# Patient Record
Sex: MALE | Race: OTHER | NOT HISPANIC OR LATINO
[De-identification: names, ages, dates, MRNs, and addresses within clinical notes are randomized per-mention and may not be internally consistent; named-entity substitution may affect disease eponyms.]

---

## 2021-05-10 ENCOUNTER — APPOINTMENT (OUTPATIENT)
Dept: INTERNAL MEDICINE | Facility: CLINIC | Age: 86
End: 2021-05-10
Payer: MEDICARE

## 2021-05-10 ENCOUNTER — NON-APPOINTMENT (OUTPATIENT)
Age: 86
End: 2021-05-10

## 2021-05-10 VITALS
WEIGHT: 163 LBS | OXYGEN SATURATION: 98 % | BODY MASS INDEX: 22.82 KG/M2 | SYSTOLIC BLOOD PRESSURE: 129 MMHG | TEMPERATURE: 97.8 F | HEART RATE: 80 BPM | HEIGHT: 71 IN | DIASTOLIC BLOOD PRESSURE: 65 MMHG

## 2021-05-10 DIAGNOSIS — H93.A3 PULSATILE TINNITUS, BILATERAL: ICD-10-CM

## 2021-05-10 DIAGNOSIS — E78.00 PURE HYPERCHOLESTEROLEMIA, UNSPECIFIED: ICD-10-CM

## 2021-05-10 DIAGNOSIS — F41.9 ANXIETY DISORDER, UNSPECIFIED: ICD-10-CM

## 2021-05-10 PROCEDURE — G0444 DEPRESSION SCREEN ANNUAL: CPT | Mod: 59

## 2021-05-10 PROCEDURE — G0442 ANNUAL ALCOHOL SCREEN 15 MIN: CPT | Mod: 59

## 2021-05-10 PROCEDURE — 93000 ELECTROCARDIOGRAM COMPLETE: CPT | Mod: 59

## 2021-05-10 PROCEDURE — 99204 OFFICE O/P NEW MOD 45 MIN: CPT | Mod: 25

## 2021-05-10 RX ORDER — AMLODIPINE BESYLATE 10 MG/1
10 TABLET ORAL
Qty: 90 | Refills: 0 | Status: DISCONTINUED | COMMUNITY
Start: 2020-12-02 | End: 2021-05-10

## 2021-05-11 LAB
25(OH)D3 SERPL-MCNC: 27.1 NG/ML
ALBUMIN SERPL ELPH-MCNC: 4.3 G/DL
ALP BLD-CCNC: 63 U/L
ALT SERPL-CCNC: 13 U/L
ANION GAP SERPL CALC-SCNC: 12 MMOL/L
AST SERPL-CCNC: 16 U/L
BASOPHILS # BLD AUTO: 0.07 K/UL
BASOPHILS NFR BLD AUTO: 1.1 %
BILIRUB SERPL-MCNC: 0.4 MG/DL
BUN SERPL-MCNC: 54 MG/DL
CALCIUM SERPL-MCNC: 9.2 MG/DL
CHLORIDE SERPL-SCNC: 105 MMOL/L
CHOLEST SERPL-MCNC: 201 MG/DL
CO2 SERPL-SCNC: 23 MMOL/L
CREAT SERPL-MCNC: 2.29 MG/DL
CREAT SPEC-SCNC: 106 MG/DL
CREAT/PROT UR: 0.3 RATIO
EOSINOPHIL # BLD AUTO: 0.13 K/UL
EOSINOPHIL NFR BLD AUTO: 2.1 %
ESTIMATED AVERAGE GLUCOSE: 120 MG/DL
GLUCOSE SERPL-MCNC: 99 MG/DL
HBA1C MFR BLD HPLC: 5.8 %
HCT VFR BLD CALC: 37.5 %
HDLC SERPL-MCNC: 74 MG/DL
HGB BLD-MCNC: 11.8 G/DL
IMM GRANULOCYTES NFR BLD AUTO: 0.2 %
LDLC SERPL CALC-MCNC: 109 MG/DL
LYMPHOCYTES # BLD AUTO: 1.52 K/UL
LYMPHOCYTES NFR BLD AUTO: 25 %
MAN DIFF?: NORMAL
MCHC RBC-ENTMCNC: 30.3 PG
MCHC RBC-ENTMCNC: 31.5 GM/DL
MCV RBC AUTO: 96.2 FL
MONOCYTES # BLD AUTO: 0.56 K/UL
MONOCYTES NFR BLD AUTO: 9.2 %
NEUTROPHILS # BLD AUTO: 3.8 K/UL
NEUTROPHILS NFR BLD AUTO: 62.4 %
NONHDLC SERPL-MCNC: 127 MG/DL
PLATELET # BLD AUTO: 285 K/UL
POTASSIUM SERPL-SCNC: 5.2 MMOL/L
PROT SERPL-MCNC: 6.3 G/DL
PROT UR-MCNC: 35 MG/DL
PSA SERPL-MCNC: 4.87 NG/ML
RBC # BLD: 3.9 M/UL
RBC # FLD: 15.9 %
SODIUM SERPL-SCNC: 140 MMOL/L
TRIGL SERPL-MCNC: 91 MG/DL
TSH SERPL-ACNC: 2.7 UIU/ML
WBC # FLD AUTO: 6.09 K/UL

## 2021-11-04 ENCOUNTER — APPOINTMENT (OUTPATIENT)
Dept: INTERNAL MEDICINE | Facility: CLINIC | Age: 86
End: 2021-11-04
Payer: MEDICARE

## 2021-11-04 PROCEDURE — G0008: CPT

## 2021-11-04 PROCEDURE — 90662 IIV NO PRSV INCREASED AG IM: CPT

## 2022-03-08 ENCOUNTER — APPOINTMENT (OUTPATIENT)
Dept: INTERNAL MEDICINE | Facility: CLINIC | Age: 87
End: 2022-03-08
Payer: MEDICARE

## 2022-03-08 ENCOUNTER — LABORATORY RESULT (OUTPATIENT)
Age: 87
End: 2022-03-08

## 2022-03-08 VITALS
OXYGEN SATURATION: 95 % | WEIGHT: 169 LBS | TEMPERATURE: 97.9 F | HEART RATE: 64 BPM | DIASTOLIC BLOOD PRESSURE: 58 MMHG | SYSTOLIC BLOOD PRESSURE: 123 MMHG | HEIGHT: 71 IN | BODY MASS INDEX: 23.66 KG/M2

## 2022-03-08 DIAGNOSIS — E87.5 HYPERKALEMIA: ICD-10-CM

## 2022-03-08 DIAGNOSIS — M79.89 OTHER SPECIFIED SOFT TISSUE DISORDERS: ICD-10-CM

## 2022-03-08 PROCEDURE — 36415 COLL VENOUS BLD VENIPUNCTURE: CPT

## 2022-03-08 PROCEDURE — 99214 OFFICE O/P EST MOD 30 MIN: CPT | Mod: 25

## 2022-03-09 ENCOUNTER — APPOINTMENT (OUTPATIENT)
Dept: ULTRASOUND IMAGING | Facility: HOSPITAL | Age: 87
End: 2022-03-09
Payer: MEDICARE

## 2022-03-09 ENCOUNTER — OUTPATIENT (OUTPATIENT)
Dept: OUTPATIENT SERVICES | Facility: HOSPITAL | Age: 87
LOS: 1 days | End: 2022-03-09
Payer: MEDICARE

## 2022-03-09 ENCOUNTER — NON-APPOINTMENT (OUTPATIENT)
Age: 87
End: 2022-03-09

## 2022-03-09 PROBLEM — E87.5 HYPERKALEMIA: Status: ACTIVE | Noted: 2022-03-09

## 2022-03-09 LAB
25(OH)D3 SERPL-MCNC: 15.7 NG/ML
ALBUMIN SERPL ELPH-MCNC: 4.5 G/DL
ALP BLD-CCNC: 77 U/L
ALT SERPL-CCNC: 7 U/L
ANION GAP SERPL CALC-SCNC: 12 MMOL/L
AST SERPL-CCNC: 17 U/L
BILIRUB SERPL-MCNC: 0.4 MG/DL
BUN SERPL-MCNC: 48 MG/DL
CALCIUM SERPL-MCNC: 9.2 MG/DL
CHLORIDE SERPL-SCNC: 107 MMOL/L
CO2 SERPL-SCNC: 21 MMOL/L
CREAT SERPL-MCNC: 2.59 MG/DL
CREAT SPEC-SCNC: 96 MG/DL
CREAT/PROT UR: 0.7 RATIO
DEPRECATED D DIMER PPP IA-ACNC: 1135 NG/ML DDU
EGFR: 23 ML/MIN/1.73M2
ERYTHROCYTE [SEDIMENTATION RATE] IN BLOOD BY WESTERGREN METHOD: 24 MM/HR
GLUCOSE SERPL-MCNC: 104 MG/DL
NT-PROBNP SERPL-MCNC: 218 PG/ML
POTASSIUM SERPL-SCNC: 5.9 MMOL/L
PROT SERPL-MCNC: 6.8 G/DL
PROT UR-MCNC: 70 MG/DL
SODIUM SERPL-SCNC: 140 MMOL/L

## 2022-03-09 PROCEDURE — 93971 EXTREMITY STUDY: CPT | Mod: 26,RT

## 2022-03-09 PROCEDURE — 93971 EXTREMITY STUDY: CPT

## 2022-03-10 ENCOUNTER — EMERGENCY (EMERGENCY)
Facility: HOSPITAL | Age: 87
LOS: 1 days | Discharge: HOME | End: 2022-03-10
Attending: EMERGENCY MEDICINE | Admitting: EMERGENCY MEDICINE
Payer: MEDICARE

## 2022-03-10 VITALS
OXYGEN SATURATION: 98 % | SYSTOLIC BLOOD PRESSURE: 172 MMHG | HEART RATE: 79 BPM | HEIGHT: 71 IN | WEIGHT: 171.08 LBS | TEMPERATURE: 98 F | RESPIRATION RATE: 18 BRPM | DIASTOLIC BLOOD PRESSURE: 72 MMHG

## 2022-03-10 VITALS
RESPIRATION RATE: 17 BRPM | OXYGEN SATURATION: 99 % | DIASTOLIC BLOOD PRESSURE: 78 MMHG | HEART RATE: 71 BPM | SYSTOLIC BLOOD PRESSURE: 166 MMHG

## 2022-03-10 DIAGNOSIS — Z88.0 ALLERGY STATUS TO PENICILLIN: ICD-10-CM

## 2022-03-10 DIAGNOSIS — I82.401 ACUTE EMBOLISM AND THROMBOSIS OF UNSPECIFIED DEEP VEINS OF RIGHT LOWER EXTREMITY: ICD-10-CM

## 2022-03-10 DIAGNOSIS — E78.5 HYPERLIPIDEMIA, UNSPECIFIED: ICD-10-CM

## 2022-03-10 DIAGNOSIS — N18.9 CHRONIC KIDNEY DISEASE, UNSPECIFIED: ICD-10-CM

## 2022-03-10 DIAGNOSIS — Z87.438 PERSONAL HISTORY OF OTHER DISEASES OF MALE GENITAL ORGANS: ICD-10-CM

## 2022-03-10 DIAGNOSIS — I12.9 HYPERTENSIVE CHRONIC KIDNEY DISEASE WITH STAGE 1 THROUGH STAGE 4 CHRONIC KIDNEY DISEASE, OR UNSPECIFIED CHRONIC KIDNEY DISEASE: ICD-10-CM

## 2022-03-10 DIAGNOSIS — Z20.822 CONTACT WITH AND (SUSPECTED) EXPOSURE TO COVID-19: ICD-10-CM

## 2022-03-10 DIAGNOSIS — R22.41 LOCALIZED SWELLING, MASS AND LUMP, RIGHT LOWER LIMB: ICD-10-CM

## 2022-03-10 LAB
ALBUMIN SERPL ELPH-MCNC: 4.4 G/DL — SIGNIFICANT CHANGE UP (ref 3.3–5)
ALP SERPL-CCNC: 72 U/L — SIGNIFICANT CHANGE UP (ref 40–120)
ALT FLD-CCNC: 12 U/L — SIGNIFICANT CHANGE UP (ref 10–45)
ANION GAP SERPL CALC-SCNC: 14 MMOL/L — SIGNIFICANT CHANGE UP (ref 5–17)
APPEARANCE: CLEAR
APTT BLD: 29.3 SEC — SIGNIFICANT CHANGE UP (ref 27.5–35.5)
AST SERPL-CCNC: 19 U/L — SIGNIFICANT CHANGE UP (ref 10–40)
BASOPHILS # BLD AUTO: 0.08 K/UL
BASOPHILS # BLD AUTO: 0.08 K/UL — SIGNIFICANT CHANGE UP (ref 0–0.2)
BASOPHILS NFR BLD AUTO: 1.1 %
BASOPHILS NFR BLD AUTO: 1.1 % — SIGNIFICANT CHANGE UP (ref 0–2)
BILIRUB SERPL-MCNC: 0.4 MG/DL — SIGNIFICANT CHANGE UP (ref 0.2–1.2)
BILIRUBIN URINE: NEGATIVE
BLOOD URINE: NEGATIVE
BUN SERPL-MCNC: 48 MG/DL — HIGH (ref 7–23)
CALCIUM SERPL-MCNC: 9.1 MG/DL — SIGNIFICANT CHANGE UP (ref 8.4–10.5)
CHLORIDE SERPL-SCNC: 106 MMOL/L — SIGNIFICANT CHANGE UP (ref 96–108)
CO2 SERPL-SCNC: 20 MMOL/L — LOW (ref 22–31)
COLOR: YELLOW
CREAT SERPL-MCNC: 2.46 MG/DL — HIGH (ref 0.5–1.3)
EGFR: 25 ML/MIN/1.73M2 — LOW
EOSINOPHIL # BLD AUTO: 0.1 K/UL
EOSINOPHIL # BLD AUTO: 0.1 K/UL — SIGNIFICANT CHANGE UP (ref 0–0.5)
EOSINOPHIL NFR BLD AUTO: 1.3 %
EOSINOPHIL NFR BLD AUTO: 1.4 % — SIGNIFICANT CHANGE UP (ref 0–6)
ESTIMATED AVERAGE GLUCOSE: 117 MG/DL
GLUCOSE QUALITATIVE U: NEGATIVE
GLUCOSE SERPL-MCNC: 103 MG/DL — HIGH (ref 70–99)
HBA1C MFR BLD HPLC: 5.7 %
HCT VFR BLD CALC: 39.5 % — SIGNIFICANT CHANGE UP (ref 39–50)
HCT VFR BLD CALC: 43.1 %
HGB BLD-MCNC: 12.4 G/DL — LOW (ref 13–17)
HGB BLD-MCNC: 13 G/DL
IMM GRANULOCYTES NFR BLD AUTO: 0.4 %
IMM GRANULOCYTES NFR BLD AUTO: 0.4 % — SIGNIFICANT CHANGE UP (ref 0–1.5)
INR BLD: 1.02 — SIGNIFICANT CHANGE UP (ref 0.88–1.16)
KETONES URINE: NEGATIVE
LEUKOCYTE ESTERASE URINE: NEGATIVE
LYMPHOCYTES # BLD AUTO: 1.29 K/UL — SIGNIFICANT CHANGE UP (ref 1–3.3)
LYMPHOCYTES # BLD AUTO: 1.57 K/UL
LYMPHOCYTES # BLD AUTO: 17.6 % — SIGNIFICANT CHANGE UP (ref 13–44)
LYMPHOCYTES NFR BLD AUTO: 20.9 %
MAN DIFF?: NORMAL
MCHC RBC-ENTMCNC: 29.1 PG — SIGNIFICANT CHANGE UP (ref 27–34)
MCHC RBC-ENTMCNC: 30 PG
MCHC RBC-ENTMCNC: 30.2 GM/DL
MCHC RBC-ENTMCNC: 31.4 GM/DL — LOW (ref 32–36)
MCV RBC AUTO: 92.7 FL — SIGNIFICANT CHANGE UP (ref 80–100)
MCV RBC AUTO: 99.5 FL
MONOCYTES # BLD AUTO: 0.51 K/UL — SIGNIFICANT CHANGE UP (ref 0–0.9)
MONOCYTES # BLD AUTO: 0.62 K/UL
MONOCYTES NFR BLD AUTO: 6.9 % — SIGNIFICANT CHANGE UP (ref 2–14)
MONOCYTES NFR BLD AUTO: 8.2 %
NEUTROPHILS # BLD AUTO: 5.12 K/UL
NEUTROPHILS # BLD AUTO: 5.33 K/UL — SIGNIFICANT CHANGE UP (ref 1.8–7.4)
NEUTROPHILS NFR BLD AUTO: 68.1 %
NEUTROPHILS NFR BLD AUTO: 72.6 % — SIGNIFICANT CHANGE UP (ref 43–77)
NITRITE URINE: NEGATIVE
NRBC # BLD: 0 /100 WBCS — SIGNIFICANT CHANGE UP (ref 0–0)
NT-PROBNP SERPL-SCNC: 240 PG/ML — SIGNIFICANT CHANGE UP (ref 0–300)
PH URINE: 6
PLATELET # BLD AUTO: 290 K/UL — SIGNIFICANT CHANGE UP (ref 150–400)
PLATELET # BLD AUTO: 309 K/UL
POTASSIUM SERPL-MCNC: 5.3 MMOL/L — SIGNIFICANT CHANGE UP (ref 3.5–5.3)
POTASSIUM SERPL-SCNC: 5.3 MMOL/L — SIGNIFICANT CHANGE UP (ref 3.5–5.3)
PROT SERPL-MCNC: 6.8 G/DL — SIGNIFICANT CHANGE UP (ref 6–8.3)
PROTEIN URINE: ABNORMAL
PROTHROM AB SERPL-ACNC: 12.1 SEC — SIGNIFICANT CHANGE UP (ref 10.5–13.4)
RBC # BLD: 4.26 M/UL — SIGNIFICANT CHANGE UP (ref 4.2–5.8)
RBC # BLD: 4.33 M/UL
RBC # FLD: 15.1 % — HIGH (ref 10.3–14.5)
RBC # FLD: 15.6 %
SARS-COV-2 RNA SPEC QL NAA+PROBE: SIGNIFICANT CHANGE UP
SODIUM SERPL-SCNC: 140 MMOL/L — SIGNIFICANT CHANGE UP (ref 135–145)
SPECIFIC GRAVITY URINE: 1.02
TROPONIN T SERPL-MCNC: 0.02 NG/ML — HIGH (ref 0–0.01)
UROBILINOGEN URINE: NORMAL
WBC # BLD: 7.34 K/UL — SIGNIFICANT CHANGE UP (ref 3.8–10.5)
WBC # FLD AUTO: 7.34 K/UL — SIGNIFICANT CHANGE UP (ref 3.8–10.5)
WBC # FLD AUTO: 7.52 K/UL

## 2022-03-10 PROCEDURE — 80053 COMPREHEN METABOLIC PANEL: CPT

## 2022-03-10 PROCEDURE — 84484 ASSAY OF TROPONIN QUANT: CPT

## 2022-03-10 PROCEDURE — U0005: CPT

## 2022-03-10 PROCEDURE — 85025 COMPLETE CBC W/AUTO DIFF WBC: CPT

## 2022-03-10 PROCEDURE — 83880 ASSAY OF NATRIURETIC PEPTIDE: CPT

## 2022-03-10 PROCEDURE — 93005 ELECTROCARDIOGRAM TRACING: CPT

## 2022-03-10 PROCEDURE — U0003: CPT

## 2022-03-10 PROCEDURE — 85610 PROTHROMBIN TIME: CPT

## 2022-03-10 PROCEDURE — 99284 EMERGENCY DEPT VISIT MOD MDM: CPT

## 2022-03-10 PROCEDURE — 36415 COLL VENOUS BLD VENIPUNCTURE: CPT

## 2022-03-10 PROCEDURE — 85730 THROMBOPLASTIN TIME PARTIAL: CPT

## 2022-03-10 RX ORDER — APIXABAN 2.5 MG/1
1 TABLET, FILM COATED ORAL
Qty: 56 | Refills: 0
Start: 2022-03-10 | End: 2022-04-06

## 2022-03-10 NOTE — ED ADULT NURSE REASSESSMENT NOTE - NS ED NURSE REASSESS COMMENT FT1
Pt refusing EKG when attempted to perform, pt states " I don't need that." Pt disgruntled about waiting in ED for consulting services, care plan reviewed, pt verbalized understanding. AAOx3.

## 2022-03-10 NOTE — CONSULT NOTE ADULT - SUBJECTIVE AND OBJECTIVE BOX
Vascular Surgery Consult Note    Attending Physician: Dr. Eckert   Consult requested by: ED    CC: RLE DVT    HPI:  87yMale retired dentist with PMHx HTN, HLD, BPH, CKD (followed by Dr. Mukesh Gomez) presents to ED for evaluation of DVT. Pt has had intermittent RLE swelling for the past few months that has been persistent for the past 10 days. Pt saw his PCP Dr. Barakat who was concerned for DVT. Pt had outpatient US done yesterday that found an extensive DVT in his RLE. 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 Presents to ED today for evaluation of DVT. Patient has been able to ambulate since the pain/swelling began. Patient is the primary caregiver for his wife and endorses an active lifestyle. Other than a coronary angiogram done on 9/2021 at [facility], patient denies recent surgeries, periods of immobilization or travel. Denies previous episodes of DVT, family history of hypercoagulable disorders, or history of cancers. Denies chest pain, SOB, wheezing, coughing, abdominal distention, or abdominal pain.    Allergies    penicillin (Rash)    Intolerances      PAST MEDICAL & SURGICAL HISTORY:  see HPI    Meds:  Betamethasone Dipropionate 0.05 % External Cream  Ergocalciferol 1.25 MG (14280 UT) Oral Capsule; TAKE 1 CAPSULE WEEKLY  Fluocinolone Acetonide 0.01 % Otic Oil  Lipitor 20 MG Oral Tablet  Metoprolol Succinate ER 25 MG Oral Tablet Extended Release 24 Hour; TAKE 1 TABLET  BY MOUTH EVERY DAY  Proscar 5 MG Oral Tablet  Ramipril 10 MG Oral Capsule; TAKE 1 CAPSULE BY MOUTH EVERY DAY  Terazosin HCl - 10 MG Oral Capsule; TAKE 1 CAPSULE BY MOUTH EVERY DAY IN THE  EVENING  Timolol Maleate 0.5 % Ophthalmic Solution  Travoprost (FABRICIO Free) 0.004 % Ophthalmic Solution      Family History:  Denies family history of bleeding disorders    Social History:   Pt is a nonsmoker  Social EtOH use     Review of Systems:  All review of systems are negative except for those mentioned in the HPI.     Physical Exam:  General: Pt Alert and oriented, NAD  RLE slightly more swollen compared to left, with slight erythema extending from the dorsum of the foot to mid anterior tibia. No skin lesions.   Trace pedal edema present bilaterally, slightly worse in RLE.   Pulses: Palpable femoral, popliteal, dp, pt pulses bilateral LEs  Sensation: SILT sural/saph/sp/dp/ tibial distributions b/l LE  Motor: 5/5 strength with Hip flexion, quads, hamstrings, dorsi/plantarflexion, EHL, FHL bilateral LE    Vital Signs Last 24 Hrs  T(C): 36.5 (10 Mar 2022 11:22), Max: 36.5 (10 Mar 2022 11:22)  T(F): 97.7 (10 Mar 2022 11:22), Max: 97.7 (10 Mar 2022 11:22)  HR: 79 (10 Mar 2022 11:22) (79 - 79)  BP: 172/72 (10 Mar 2022 11:22) (172/72 - 172/72)  BP(mean): --  RR: 18 (10 Mar 2022 11:22) (18 - 18)  SpO2: 98% (10 Mar 2022 11:22) (98% - 98%)      Labs:                        12.4   7.34  )-----------( 290      ( 10 Mar 2022 12:35 )             39.5     03-10    140  |  106  |  48<H>  ----------------------------<  103<H>  5.3   |  20<L>  |  2.46<H>    Ca    9.1      10 Mar 2022 12:35    TPro  6.8  /  Alb  4.4  /  TBili  0.4  /  DBili  x   /  AST  19  /  ALT  12  /  AlkPhos  72  03-10    PT/INR - ( 10 Mar 2022 12:35 )   PT: 12.1 sec;   INR: 1.02          PTT - ( 10 Mar 2022 12:35 )  PTT:29.3 sec    Imaging:   RLE Venous Doppler 3/9/2022:   Extensive deep vein thrombosis extending from the right mid femoral vein   through the peroneal veins.    There is normal compressibility of the right common femoral, proximal   femoral, and deep femoral veins.    The contralateral common femoral vein is patent.    A/P: 87yMale with PMHx HTN, HLD, BPH, CKD (followed by Dr. Mukesh Gomez) presenting to the ED for treatment of confirmed DVT extending from the right mid femoral vein   through the peroneal veins.  - stable, no acute distress, vss  - obtain EKG  - start patient on chemical anticoagulation  - no vascular surgical intervention at this time  - f/u with Dr. Eckert outpatient in 1 week  - Discussed with Attending, Dr. Eckert

## 2022-03-10 NOTE — ED PROVIDER NOTE - NSFOLLOWUPINSTRUCTIONS_ED_ALL_ED_FT
Please   Deep Vein Thrombosis    A deep vein thrombosis (DVT) is a blood clot (thrombus) that usually occurs in a deep, larger vein of the lower leg or the pelvis, or in an upper extremity such as the arm. These are dangerous and can lead to serious and even life-threatening complications if the clot travels to the lungs. Symptoms include swelling of the arm or leg, warmth and redness of the arm or leg, and pain. Treatment may include taking a blood thinning medication; make sure to take anything prescribed as instructed.    SEEK IMMEDIATE MEDICAL CARE IF YOU HAVE ANY OF THE FOLLOWING SYMPTOMS: shortness of breath, chest pain, rapid or irregular heartbeat, lightheadedness/dizziness, coughing up blood, or any bleeding in your vomit, stool, or urine. These symptoms may represent a serious problem that is an emergency. Do not wait to see if the symptoms will go away. Call 911 and do not drive yourself to the hospital. Please follow up with Dr. Barakat on Monday for further workup about why you developed this clot  Please also follow up with your nephrologist Dr. Gomez next week  Please follow up with your Cardiologist Dr. Cardoza to make sure that your heart isnt showing any signs of stress from these clots      Deep Vein Thrombosis    A deep vein thrombosis (DVT) is a blood clot (thrombus) that usually occurs in a deep, larger vein of the lower leg or the pelvis, or in an upper extremity such as the arm. These are dangerous and can lead to serious and even life-threatening complications if the clot travels to the lungs. Symptoms include swelling of the arm or leg, warmth and redness of the arm or leg, and pain. Treatment may include taking a blood thinning medication; make sure to take anything prescribed as instructed.    SEEK IMMEDIATE MEDICAL CARE IF YOU HAVE ANY OF THE FOLLOWING SYMPTOMS: shortness of breath, chest pain, rapid or irregular heartbeat, lightheadedness/dizziness, coughing up blood, or any bleeding in your vomit, stool, or urine. These symptoms may represent a serious problem that is an emergency. Do not wait to see if the symptoms will go away. Call 911 and do not drive yourself to the hospital. Please follow up with Dr. Barakat on Monday for further workup about why you developed this clot  Please follow up with our vascular surgeon Dr. Puckett in 1-2 weeks to help evaluate if the clot is starting to resolve  Please follow up with your Cardiologist Dr. Cardoza to make sure that your heart isnt showing any signs of stress from these clots  Please also follow up with your nephrologist Dr. Gomez in the next 1-2 weeks for follow up of your kidney function        Deep Vein Thrombosis    A deep vein thrombosis (DVT) is a blood clot (thrombus) that usually occurs in a deep, larger vein of the lower leg or the pelvis, or in an upper extremity such as the arm. These are dangerous and can lead to serious and even life-threatening complications if the clot travels to the lungs. Symptoms include swelling of the arm or leg, warmth and redness of the arm or leg, and pain. Treatment may include taking a blood thinning medication; make sure to take anything prescribed as instructed.    SEEK IMMEDIATE MEDICAL CARE IF YOU HAVE ANY OF THE FOLLOWING SYMPTOMS: shortness of breath, chest pain, rapid or irregular heartbeat, lightheadedness/dizziness, coughing up blood, or any bleeding in your vomit, stool, or urine. These symptoms may represent a serious problem that is an emergency. Do not wait to see if the symptoms will go away. Call 911 and do not drive yourself to the hospital.

## 2022-03-10 NOTE — ED PROVIDER NOTE - PATIENT PORTAL LINK FT
You can access the FollowMyHealth Patient Portal offered by [facility] by registering at the following website: [email or web address]. By joining [employer]’s FollowMyHealth portal, you will also be able to view your health information using other applications (apps) compatible with our system.

## 2022-03-10 NOTE — PROGRESS NOTE ADULT - SUBJECTIVE AND OBJECTIVE BOX
Vascular Surgery Consult Note    Attending Physician: Dr. Eckert   Consult requested by: ED    CC: RLE DVT    HPI:  87yMale retired dentist with PMHx HTN, HLD, BPH, CKD (followed by Dr. Mukesh Gomez) presents to ED for evaluation of DVT. Pt has had intermittent RLE swelling for the past few months that has been persistent for the past 10 days. Pt saw his PCP Dr. Barakat who was concerned for DVT. Pt had outpatient US done yesterday that found an extensive DVT in his RLE. 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 Presents to ED today for evaluation of DVT. Patient has been able to ambulate since the pain/swelling began. Patient is the primary caregiver for his wife and endorses an active lifestyle. Other than a coronary angiogram done on 9/2021 at [facility], patient denies recent surgeries, periods of immobilization or travel. Denies previous episodes of DVT, family history of hypercoagulable disorders, or history of cancers. Denies chest pain, SOB, wheezing, coughing, abdominal distention, or abdominal pain.    Allergies    penicillin (Rash)    Intolerances      PAST MEDICAL & SURGICAL HISTORY:  see HPI    Meds:  Betamethasone Dipropionate 0.05 % External Cream  Ergocalciferol 1.25 MG (89878 UT) Oral Capsule; TAKE 1 CAPSULE WEEKLY  Fluocinolone Acetonide 0.01 % Otic Oil  Lipitor 20 MG Oral Tablet  Metoprolol Succinate ER 25 MG Oral Tablet Extended Release 24 Hour; TAKE 1 TABLET  BY MOUTH EVERY DAY  Proscar 5 MG Oral Tablet  Ramipril 10 MG Oral Capsule; TAKE 1 CAPSULE BY MOUTH EVERY DAY  Terazosin HCl - 10 MG Oral Capsule; TAKE 1 CAPSULE BY MOUTH EVERY DAY IN THE  EVENING  Timolol Maleate 0.5 % Ophthalmic Solution  Travoprost (FABRICIO Free) 0.004 % Ophthalmic Solution      Family History:  Denies family history of bleeding disorders    Social History:   Pt is a nonsmoker  Social EtOH use     Review of Systems:  All review of systems are negative except for those mentioned in the HPI.     Physical Exam:  General: Pt Alert and oriented, NAD  RLE slightly more swollen compared to left, with slight erythema extending from the dorsum of the foot to mid anterior tibia. No skin lesions.   Trace pedal edema present bilaterally, slightly worse in RLE.   Pulses: Palpable femoral, popliteal, dp, pt pulses bilateral LEs  Sensation: SILT sural/saph/sp/dp/ tibial distributions b/l LE  Motor: 5/5 strength with Hip flexion, quads, hamstrings, dorsi/plantarflexion, EHL, FHL bilateral LE    Vital Signs Last 24 Hrs  T(C): 36.5 (10 Mar 2022 11:22), Max: 36.5 (10 Mar 2022 11:22)  T(F): 97.7 (10 Mar 2022 11:22), Max: 97.7 (10 Mar 2022 11:22)  HR: 79 (10 Mar 2022 11:22) (79 - 79)  BP: 172/72 (10 Mar 2022 11:22) (172/72 - 172/72)  BP(mean): --  RR: 18 (10 Mar 2022 11:22) (18 - 18)  SpO2: 98% (10 Mar 2022 11:22) (98% - 98%)      Labs:                        12.4   7.34  )-----------( 290      ( 10 Mar 2022 12:35 )             39.5     03-10    140  |  106  |  48<H>  ----------------------------<  103<H>  5.3   |  20<L>  |  2.46<H>    Ca    9.1      10 Mar 2022 12:35    TPro  6.8  /  Alb  4.4  /  TBili  0.4  /  DBili  x   /  AST  19  /  ALT  12  /  AlkPhos  72  03-10    PT/INR - ( 10 Mar 2022 12:35 )   PT: 12.1 sec;   INR: 1.02          PTT - ( 10 Mar 2022 12:35 )  PTT:29.3 sec    Imaging:   RLE Venous Doppler 3/9/2022:   Extensive deep vein thrombosis extending from the right mid femoral vein   through the peroneal veins.    There is normal compressibility of the right common femoral, proximal   femoral, and deep femoral veins.    The contralateral common femoral vein is patent.    A/P: 87yMale with PMHx HTN, HLD, BPH, CKD (followed by Dr. Mukesh Gomez) presenting to the ED for treatment of confirmed DVT extending from the right mid femoral vein   through the peroneal veins.  - stable, no acute distress, vss  - obtain EKG  - start patient on chemical anticoagulation  - no vascular surgical intervention at this time  - f/u with Dr. Eckert outpatient in 1 week  - Discussed with Attending, Dr. Eckert

## 2022-03-10 NOTE — ED PROVIDER NOTE - PHYSICAL EXAMINATION
Prophylactic measure
Essential hypertension
Essential hypertension
UTI (urinary tract infection)
CONSTITUTIONAL: Well-appearing; in no apparent distress.   HEAD: Normocephalic; atraumatic.   EYES:  conjunctiva and sclera clear  ENT: normal nose; no rhinorrhea; normal pharynx with no erythema or lesions.   NECK: Supple; non-tender;   CARDIOVASCULAR: Normal S1, S2; no murmurs, rubs, or gallops. Regular rate and rhythm.   RESPIRATORY: Breathing easily; breath sounds clear and equal bilaterally; no wheezes, rhonchi, or rales.  GI: Soft; non-distended; non-tender; no palpable organomegaly.   EXT: DP pulses faint but palpable. Venous congestion around right medial malleolus. Mild cyanosis. Sensation intact. No calf tenderness.   SKIN: Normal for age and race; warm; dry; good turgor; no apparent lesions or rash.   NEURO: A & O x 3; face symmetric; grossly unremarkable.   PSYCHOLOGICAL: The patient’s mood and manner are appropriate.
UTI (urinary tract infection)
Hyponatremia
Prophylactic measure
UTI (urinary tract infection)
Essential hypertension

## 2022-03-10 NOTE — ED PROVIDER NOTE - CARE PROVIDER_API CALL
Meño Puckett)  [facility]  Vascular  [address]  [place], [place] [address]  Phone: [phone]  Fax: [phone]  Follow Up Time: 7-10 Days

## 2022-03-10 NOTE — CONSULT NOTE ADULT - SUBJECTIVE AND OBJECTIVE BOX
Vascular Cardiology  Consult Note    Attending: Italo    CC:  R DVT    HPI:    88 y/o M with PMHx CKD (followed by Dr. Mukesh Gomez) presents to ED for evaluation of DVT. Pt has had intermittent RLE swelling for the past few months that has been persistent for the past 10 days. Pt saw his PCP Dr. Barakat who was concerned for DVT. Pt had outpatient US done yesterday that found an extensive DVT in his RLE. 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 Presents to ED today for evaluation of DVT. Denies chest pain, SOB, wheezing, coughing, abdominal distention, or abdominal pain.    For PE:  Provoked/Unprovoked? (long haul air travel, immobility, surgery)  Duration of symptoms?  Leg pain or swelling?  Cancer Screening: (mammo, pap, colonoscopy, prostate?)  Syncope?  Prior VTE?  Family history of VTE?  OCP use?  Prior bleeding?    For PAD:  Claudication?  Ulceration on feet?  Prior vascular testing?  Podiatrist?  Smoker?  On antiplatelet and statin therapy?  Concern for infection/cellulitis?  Renal function?  Prior stents/intervention?  Allergy to contrast?           Allergies    penicillin (Rash)    Intolerances    	    MEDICATIONS:                  PAST MEDICAL & SURGICAL HISTORY:      FAMILY HISTORY:      SOCIAL HISTORY:  unchanged    REVIEW OF SYSTEMS:  CONSTITUTIONAL: No fever, weight loss, or fatigue  EYES: No eye pain, visual disturbances, or discharge  ENMT:  No difficulty hearing, tinnitus, vertigo; No sinus or throat pain  NECK: No pain or stiffness  RESPIRATORY: No cough, wheezing, chills or hemoptysis; No Shortness of Breath    CARDIOVASCULAR: No chest pain, palpitations, passing out, dizziness, or leg swelling    GASTROINTESTINAL: No abdominal or epigastric pain. No nausea, vomiting, or hematemesis; No diarrhea or constipation. No melena or hematochezia.  GENITOURINARY: No dysuria, frequency, hematuria, or incontinence  NEUROLOGICAL: No headaches, memory loss, loss of strength, numbness, or tremors  SKIN: No itching, burning, rashes, or lesions   LYMPH Nodes: No enlarged glands  ENDOCRINE: No heat or cold intolerance; No hair loss  MUSCULOSKELETAL: No joint pain or swelling; No muscle, back, or extremity pain  PSYCHIATRIC: No depression, anxiety, mood swings, or difficulty sleeping  HEME/LYMPH: No easy bruising, or bleeding gums  ALLERY AND IMMUNOLOGIC: No hives or eczema	    [ x] All others negative	  [ ] Unable to obtain    PHYSICAL EXAM:  T(C): 36.5 (03-10-22 @ 11:22), Max: 36.5 (03-10-22 @ 11:22)  HR: 79 (03-10-22 @ 11:22) (79 - 79)  BP: 172/72 (03-10-22 @ 11:22) (172/72 - 172/72)  RR: 18 (03-10-22 @ 11:22) (18 - 18)  SpO2: 98% (03-10-22 @ 11:22) (98% - 98%)  Wt(kg): --  I&O's Summary      Appearance: Normal	  HEENT:   Normal oral mucosa, PERRL, EOMI	  Carotid:   Right:  No Bruit      Left:  No bruit  Lymphatic: No lymphadenopathy  Cardiovascular: Normal S1 S2, No JVD, No murmurs  Respiratory: Lungs clear to auscultation	  Psychiatry: A & O x 3, Mood & affect appropriate  Gastrointestinal:  Soft, Non-tender, + BS	  Skin: No rashes, No ecchymoses, No cyanosis	  Neurologic: Non-focal  Extremities: Normal range of motion.    Vascular Pulse Exam:  Right Femoral:  Palpable       Left Femoral:  Palpable  Right Popliteal:  Palpable      Left Popliteal:  Palpable  Right DP:  Palpable                 Left DP:  Palpable  Right PT:  Palpable                 Left DP:  Palpable    Foot Exam:  Warm, no ulceration.        LABS:	 	    CBC Full  -  ( 10 Mar 2022 12:35 )  WBC Count : 7.34 K/uL  Hemoglobin : 12.4 g/dL  Hematocrit : 39.5 %  Platelet Count - Automated : 290 K/uL  Mean Cell Volume : 92.7 fl  Mean Cell Hemoglobin : 29.1 pg  Mean Cell Hemoglobin Concentration : 31.4 gm/dL  Auto Neutrophil # : 5.33 K/uL  Auto Lymphocyte # : 1.29 K/uL  Auto Monocyte # : 0.51 K/uL  Auto Eosinophil # : 0.10 K/uL  Auto Basophil # : 0.08 K/uL  Auto Neutrophil % : 72.6 %  Auto Lymphocyte % : 17.6 %  Auto Monocyte % : 6.9 %  Auto Eosinophil % : 1.4 %  Auto Basophil % : 1.1 %    03-10    140  |  106  |  48<H>  ----------------------------<  103<H>  5.3   |  20<L>  |  2.46<H>    Ca    9.1      10 Mar 2022 12:35    TPro  6.8  /  Alb  4.4  /  TBili  0.4  /  DBili  x   /  AST  19  /  ALT  12  /  AlkPhos  72  03-10          Assessment/Plan:        Vascular Cardiology Service    Please call with any questions:   [facility] - [id number]  Office [phone]  email:  [email or web address]     Vascular Surgery Consult Note    Attending Physician: Italo  Consult requested by: ED    CC: RLE DVT    HPI:  87yMale    Allergies    penicillin (Rash)    Intolerances      PAST MEDICAL & SURGICAL HISTORY:      Meds:      Family History:  Denies family history of bleeding disorders    Social History:   Pt is a nonsmoker  Social EtOH use     Review of Systems:      Physical Exam:  General: Pt Alert and oriented, NAD  DSG C/D/I  Pulses: 2+ radial or dp, pt pulses, wwp, cap refill <3 seconds  Sensation: SILT sural/saph/sp/dp/ tibial or axillary/radial/median/ulnar distributions  Motor: EHL/FHL/TA/GS or axillary/AIN/PIN/ulnar firing    Vital Signs Last 24 Hrs  T(C): 36.5 (10 Mar 2022 11:22), Max: 36.5 (10 Mar 2022 11:22)  T(F): 97.7 (10 Mar 2022 11:22), Max: 97.7 (10 Mar 2022 11:22)  HR: 79 (10 Mar 2022 11:22) (79 - 79)  BP: 172/72 (10 Mar 2022 11:22) (172/72 - 172/72)  BP(mean): --  RR: 18 (10 Mar 2022 11:22) (18 - 18)  SpO2: 98% (10 Mar 2022 11:22) (98% - 98%)      Labs:                        12.4   7.34  )-----------( 290      ( 10 Mar 2022 12:35 )             39.5     03-10    140  |  106  |  48<H>  ----------------------------<  103<H>  5.3   |  20<L>  |  2.46<H>    Ca    9.1      10 Mar 2022 12:35    TPro  6.8  /  Alb  4.4  /  TBili  0.4  /  DBili  x   /  AST  19  /  ALT  12  /  AlkPhos  72  03-10    PT/INR - ( 10 Mar 2022 12:35 )   PT: 12.1 sec;   INR: 1.02          PTT - ( 10 Mar 2022 12:35 )  PTT:29.3 sec    Imaging:     A/P: 87yMale      - Discussed with Attending, Dr. Kenneth Tse, PGY-1  Ortho Pager [phone]   Vascular Surgery Consult Note    Attending Physician: Italo  Consult requested by: ED    CC: RLE DVT    HPI:  87yMale with PMHx HTN, HLD, BPH, CKD (followed by Dr. Mukesh Gomez) presents to ED for evaluation of DVT. Pt has had intermittent RLE swelling for the past few months that has been persistent for the past 10 days. Pt saw his PCP Dr. Barakat who was concerned for DVT. Pt had outpatient US done yesterday that found an extensive DVT in his RLE. 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 Presents to ED today for evaluation of DVT. Patient has been able to ambulate since the pain/swelling began. Other than a coronary angiogram done on 9/2021 at [facility], patient denies recent surgeries, periods of immobilization or travel. Denies previous episodes of DVT, family history of hypercoagulable disorders, or history of cancers. Denies chest pain, SOB, wheezing, coughing, abdominal distention, or abdominal pain.    Allergies    penicillin (Rash)    Intolerances      PAST MEDICAL & SURGICAL HISTORY:      Meds:      Family History:  Denies family history of bleeding disorders    Social History:   Pt is a nonsmoker  Social EtOH use     Review of Systems:  All review of systems are negative except for those mentioned in the HPI.     Physical Exam:  General: Pt Alert and oriented, NAD  RLE slightly more swollen compared to left, with slight erythema extending from the dorsum of the foot to mid anterior tibia. No skin lesions.   Trace pedal edema present bilaterally, slightly worse in RLE.   Pulses: Palpable femoral, popliteal, dp, pt pulses bilateral LEs  Sensation: SILT sural/saph/sp/dp/ tibial distributions b/l LE  Motor: 5/5 strength with Hip flexion, quads, hamstrings, dorsi/plantarflexion, EHL, FHL bilateral LE    Vital Signs Last 24 Hrs  T(C): 36.5 (10 Mar 2022 11:22), Max: 36.5 (10 Mar 2022 11:22)  T(F): 97.7 (10 Mar 2022 11:22), Max: 97.7 (10 Mar 2022 11:22)  HR: 79 (10 Mar 2022 11:22) (79 - 79)  BP: 172/72 (10 Mar 2022 11:22) (172/72 - 172/72)  BP(mean): --  RR: 18 (10 Mar 2022 11:22) (18 - 18)  SpO2: 98% (10 Mar 2022 11:22) (98% - 98%)      Labs:                        12.4   7.34  )-----------( 290      ( 10 Mar 2022 12:35 )             39.5     03-10    140  |  106  |  48<H>  ----------------------------<  103<H>  5.3   |  20<L>  |  2.46<H>    Ca    9.1      10 Mar 2022 12:35    TPro  6.8  /  Alb  4.4  /  TBili  0.4  /  DBili  x   /  AST  19  /  ALT  12  /  AlkPhos  72  03-10    PT/INR - ( 10 Mar 2022 12:35 )   PT: 12.1 sec;   INR: 1.02          PTT - ( 10 Mar 2022 12:35 )  PTT:29.3 sec    Imaging:   RLE Venous Doppler 3/9/2022:   Extensive deep vein thrombosis extending from the right mid femoral vein   through the peroneal veins.    There is normal compressibility of the right common femoral, proximal   femoral, and deep femoral veins.    The contralateral common femoral vein is patent.    A/P: 87yMale with PMHx HTN, HLD, BPH, CKD (followed by Dr. Mukesh Gomez) presenting to the ED for treatment of confirmed DVT extending from the right mid femoral vein   through the peroneal veins.  - stable, no acute distress, vss  - obtain EKG  - start patient on chemical anticoagulation  - no vascular surgical intervention at this time  - f/u with Dr. Puckett outpatient in 1 week  - Discussed with Attending, Dr. Italo Tse, PGY-1  Vascular Surgery   Vascular Surgery Consult Note    Attending Physician: Italo  Consult requested by: ED    CC: RLE DVT    HPI:  87yMale retired dentist with PMHx HTN, HLD, BPH, CKD (followed by Dr. Mukesh Gomez) presents to ED for evaluation of DVT. Pt has had intermittent RLE swelling for the past few months that has been persistent for the past 10 days. Pt saw his PCP Dr. Barakat who was concerned for DVT. Pt had outpatient US done yesterday that found an extensive DVT in his RLE. 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 Presents to ED today for evaluation of DVT. Patient has been able to ambulate since the pain/swelling began. Patient is the primary caregiver for his wife and endorses an active lifestyle. Other than a coronary angiogram done on 9/2021 at [facility], patient denies recent surgeries, periods of immobilization or travel. Denies previous episodes of DVT, family history of hypercoagulable disorders, or history of cancers. Denies chest pain, SOB, wheezing, coughing, abdominal distention, or abdominal pain.    Allergies    penicillin (Rash)    Intolerances      PAST MEDICAL & SURGICAL HISTORY:  see HPI    Meds:  Betamethasone Dipropionate 0.05 % External Cream  Ergocalciferol 1.25 MG (05230 UT) Oral Capsule; TAKE 1 CAPSULE WEEKLY  Fluocinolone Acetonide 0.01 % Otic Oil  Lipitor 20 MG Oral Tablet  Metoprolol Succinate ER 25 MG Oral Tablet Extended Release 24 Hour; TAKE 1 TABLET  BY MOUTH EVERY DAY  Proscar 5 MG Oral Tablet  Ramipril 10 MG Oral Capsule; TAKE 1 CAPSULE BY MOUTH EVERY DAY  Terazosin HCl - 10 MG Oral Capsule; TAKE 1 CAPSULE BY MOUTH EVERY DAY IN THE  EVENING  Timolol Maleate 0.5 % Ophthalmic Solution  Travoprost (FABRICIO Free) 0.004 % Ophthalmic Solution      Family History:  Denies family history of bleeding disorders    Social History:   Pt is a nonsmoker  Social EtOH use     Review of Systems:  All review of systems are negative except for those mentioned in the HPI.     Physical Exam:  General: Pt Alert and oriented, NAD  RLE slightly more swollen compared to left, with slight erythema extending from the dorsum of the foot to mid anterior tibia. No skin lesions.   Trace pedal edema present bilaterally, slightly worse in RLE.   Pulses: Palpable femoral, popliteal, dp, pt pulses bilateral LEs  Sensation: SILT sural/saph/sp/dp/ tibial distributions b/l LE  Motor: 5/5 strength with Hip flexion, quads, hamstrings, dorsi/plantarflexion, EHL, FHL bilateral LE    Vital Signs Last 24 Hrs  T(C): 36.5 (10 Mar 2022 11:22), Max: 36.5 (10 Mar 2022 11:22)  T(F): 97.7 (10 Mar 2022 11:22), Max: 97.7 (10 Mar 2022 11:22)  HR: 79 (10 Mar 2022 11:22) (79 - 79)  BP: 172/72 (10 Mar 2022 11:22) (172/72 - 172/72)  BP(mean): --  RR: 18 (10 Mar 2022 11:22) (18 - 18)  SpO2: 98% (10 Mar 2022 11:22) (98% - 98%)      Labs:                        12.4   7.34  )-----------( 290      ( 10 Mar 2022 12:35 )             39.5     03-10    140  |  106  |  48<H>  ----------------------------<  103<H>  5.3   |  20<L>  |  2.46<H>    Ca    9.1      10 Mar 2022 12:35    TPro  6.8  /  Alb  4.4  /  TBili  0.4  /  DBili  x   /  AST  19  /  ALT  12  /  AlkPhos  72  03-10    PT/INR - ( 10 Mar 2022 12:35 )   PT: 12.1 sec;   INR: 1.02          PTT - ( 10 Mar 2022 12:35 )  PTT:29.3 sec    Imaging:   RLE Venous Doppler 3/9/2022:   Extensive deep vein thrombosis extending from the right mid femoral vein   through the peroneal veins.    There is normal compressibility of the right common femoral, proximal   femoral, and deep femoral veins.    The contralateral common femoral vein is patent.    A/P: 87yMale with PMHx HTN, HLD, BPH, CKD (followed by Dr. Mukesh Gomez) presenting to the ED for treatment of confirmed DVT extending from the right mid femoral vein   through the peroneal veins.  - stable, no acute distress, vss  - obtain EKG  - start patient on chemical anticoagulation  - no vascular surgical intervention at this time  - f/u with Dr. Puckett outpatient in 1 week  - Discussed with Attending, Dr. Italo Tse, PGY-1  Vascular Surgery

## 2022-03-10 NOTE — ED PROVIDER NOTE - CLINICAL SUMMARY MEDICAL DECISION MAKING FREE TEXT BOX
86 y/o M here with concern for extensive DVT in setting of CKD. Vascular to assess need for surgical management vs oral apixaban. Case discussed with nephrology.

## 2022-03-10 NOTE — ED ADULT NURSE NOTE - OBJECTIVE STATEMENT
Pt had duplex of RLE yesterday and confirmed DVT was found. Pt states they told him to come in which he was unable yesterday. pt presents today requesting treatment. Denies hx of over thrombi, denies active cp or sob. 20g PIV placed, labs collected. Pt had duplex of RLE yesterday and confirmed DVT was found. Pt states they told him to come in which he was unable yesterday. Pt has had swelling of RLE for x10 days prompting US, presents today requesting treatment. Denies hx of any other thrombi, denies active cp or sob. 20g PIV placed, labs collected.

## 2022-03-10 NOTE — ED ADULT TRIAGE NOTE - CHIEF COMPLAINT QUOTE
"I was here yesterday and had an ultrasound. They determined that I have a clot in the right leg. They told me to come in this morning."

## 2022-03-10 NOTE — ED PROVIDER NOTE - OBJECTIVE STATEMENT
86 y/o M with PMHx CKD (followed by Dr. Mukesh Gomez) presents to ED for evaluation of DVT. Pt has had intermittent RLE swelling for the past few months that has been persistent for the past 10 days. Pt saw his PCP Dr. Barakat who was concerned for DVT. Pt had outpatient US done yesterday that found an extensive DVT in his RLE. 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 Presents to ED today for evaluation of DVT. Denies chest pain, SOB, wheezing, coughing, abdominal distention, or abdominal pain.

## 2022-03-10 NOTE — ED PROVIDER NOTE - CARE PROVIDERS DIRECT ADDRESSES
[email or web address]

## 2022-03-14 ENCOUNTER — APPOINTMENT (OUTPATIENT)
Dept: INTERNAL MEDICINE | Facility: CLINIC | Age: 87
End: 2022-03-14

## 2022-03-17 PROBLEM — N18.9 CHRONIC KIDNEY DISEASE, UNSPECIFIED: Chronic | Status: ACTIVE | Noted: 2022-03-10

## 2022-03-21 ENCOUNTER — APPOINTMENT (OUTPATIENT)
Dept: INTERNAL MEDICINE | Facility: CLINIC | Age: 87
End: 2022-03-21
Payer: MEDICARE

## 2022-03-21 ENCOUNTER — NON-APPOINTMENT (OUTPATIENT)
Age: 87
End: 2022-03-21

## 2022-03-21 VITALS
HEART RATE: 71 BPM | TEMPERATURE: 98.6 F | DIASTOLIC BLOOD PRESSURE: 69 MMHG | OXYGEN SATURATION: 96 % | WEIGHT: 171 LBS | HEIGHT: 71 IN | SYSTOLIC BLOOD PRESSURE: 175 MMHG | BODY MASS INDEX: 23.94 KG/M2

## 2022-03-21 DIAGNOSIS — Z12.11 ENCOUNTER FOR SCREENING FOR MALIGNANT NEOPLASM OF COLON: ICD-10-CM

## 2022-03-21 PROCEDURE — 36415 COLL VENOUS BLD VENIPUNCTURE: CPT

## 2022-03-21 PROCEDURE — 99215 OFFICE O/P EST HI 40 MIN: CPT | Mod: 25

## 2022-03-21 PROCEDURE — 93000 ELECTROCARDIOGRAM COMPLETE: CPT

## 2022-03-21 RX ORDER — BETAMETHASONE DIPROPIONATE 0.5 MG/G
0.05 CREAM TOPICAL
Qty: 30 | Refills: 0 | Status: DISCONTINUED | COMMUNITY
Start: 2021-04-16 | End: 2022-03-21

## 2022-03-21 RX ORDER — TRAVOPROST 0.04 MG/ML
0 SOLUTION OPHTHALMIC
Qty: 2 | Refills: 0 | Status: DISCONTINUED | COMMUNITY
Start: 2021-02-25 | End: 2022-03-21

## 2022-03-21 RX ORDER — SODIUM POLYSTYRENE SULFONATE 4.1 MEQ/G
POWDER, FOR SUSPENSION ORAL; RECTAL DAILY
Qty: 45 | Refills: 2 | Status: DISCONTINUED | COMMUNITY
Start: 2022-03-09 | End: 2022-03-21

## 2022-03-21 RX ORDER — FLUOCINOLONE ACETONIDE 0.11 MG/ML
0.01 OIL AURICULAR (OTIC)
Qty: 20 | Refills: 0 | Status: DISCONTINUED | COMMUNITY
Start: 2021-04-16 | End: 2022-03-21

## 2022-03-21 RX ORDER — RAMIPRIL 10 MG/1
10 CAPSULE ORAL
Qty: 90 | Refills: 3 | Status: DISCONTINUED | COMMUNITY
Start: 2020-10-08 | End: 2022-03-21

## 2022-03-22 LAB
ALBUMIN SERPL ELPH-MCNC: 4.2 G/DL
ALP BLD-CCNC: 72 U/L
ALT SERPL-CCNC: 10 U/L
ANION GAP SERPL CALC-SCNC: 13 MMOL/L
AST SERPL-CCNC: 17 U/L
BASOPHILS # BLD AUTO: 0.09 K/UL
BASOPHILS NFR BLD AUTO: 1.1 %
BILIRUB SERPL-MCNC: 0.3 MG/DL
BUN SERPL-MCNC: 37 MG/DL
CALCIUM SERPL-MCNC: 9.2 MG/DL
CANCER AG19-9 SERPL-ACNC: 15 U/ML
CEA SERPL-MCNC: 3.7 NG/ML
CHLORIDE SERPL-SCNC: 109 MMOL/L
CO2 SERPL-SCNC: 22 MMOL/L
CREAT SERPL-MCNC: 2.24 MG/DL
CREAT SPEC-SCNC: 112 MG/DL
CREAT/PROT UR: 1.5 RATIO
EGFR: 28 ML/MIN/1.73M2
EOSINOPHIL # BLD AUTO: 0.13 K/UL
EOSINOPHIL NFR BLD AUTO: 1.5 %
GLUCOSE SERPL-MCNC: 112 MG/DL
HCT VFR BLD CALC: 40.5 %
HGB BLD-MCNC: 12.5 G/DL
IMM GRANULOCYTES NFR BLD AUTO: 0.4 %
LDH SERPL-CCNC: 269 U/L
LYMPHOCYTES # BLD AUTO: 1.81 K/UL
LYMPHOCYTES NFR BLD AUTO: 21.4 %
MAN DIFF?: NORMAL
MCHC RBC-ENTMCNC: 30.6 PG
MCHC RBC-ENTMCNC: 30.9 GM/DL
MCV RBC AUTO: 99.3 FL
MONOCYTES # BLD AUTO: 0.6 K/UL
MONOCYTES NFR BLD AUTO: 7.1 %
NEUTROPHILS # BLD AUTO: 5.79 K/UL
NEUTROPHILS NFR BLD AUTO: 68.5 %
PLATELET # BLD AUTO: 304 K/UL
POTASSIUM SERPL-SCNC: 5.3 MMOL/L
PROT SERPL-MCNC: 6.1 G/DL
PROT UR-MCNC: 171 MG/DL
PSA SERPL-MCNC: 5.32 NG/ML
RBC # BLD: 4.08 M/UL
RBC # FLD: 15.8 %
SODIUM SERPL-SCNC: 144 MMOL/L
TSH SERPL-ACNC: 2.84 UIU/ML
WBC # FLD AUTO: 8.45 K/UL

## 2022-04-04 ENCOUNTER — RX RENEWAL (OUTPATIENT)
Age: 87
End: 2022-04-04

## 2022-06-27 ENCOUNTER — APPOINTMENT (OUTPATIENT)
Dept: INTERNAL MEDICINE | Facility: CLINIC | Age: 87
End: 2022-06-27

## 2022-06-27 VITALS
HEART RATE: 66 BPM | WEIGHT: 173 LBS | TEMPERATURE: 98 F | BODY MASS INDEX: 24.22 KG/M2 | HEIGHT: 71 IN | OXYGEN SATURATION: 96 % | SYSTOLIC BLOOD PRESSURE: 159 MMHG | DIASTOLIC BLOOD PRESSURE: 57 MMHG

## 2022-06-27 DIAGNOSIS — E55.9 VITAMIN D DEFICIENCY, UNSPECIFIED: ICD-10-CM

## 2022-06-27 DIAGNOSIS — R73.02 IMPAIRED GLUCOSE TOLERANCE (ORAL): ICD-10-CM

## 2022-06-27 DIAGNOSIS — R60.0 LOCALIZED EDEMA: ICD-10-CM

## 2022-06-27 DIAGNOSIS — R53.81 OTHER MALAISE: ICD-10-CM

## 2022-06-27 DIAGNOSIS — R30.0 DYSURIA: ICD-10-CM

## 2022-06-27 DIAGNOSIS — Z00.00 ENCOUNTER FOR GENERAL ADULT MEDICAL EXAMINATION W/OUT ABNORMAL FINDINGS: ICD-10-CM

## 2022-06-27 DIAGNOSIS — I35.1 NONRHEUMATIC AORTIC (VALVE) INSUFFICIENCY: ICD-10-CM

## 2022-06-27 PROCEDURE — G0439: CPT

## 2022-06-27 PROCEDURE — 36415 COLL VENOUS BLD VENIPUNCTURE: CPT

## 2022-06-28 LAB
25(OH)D3 SERPL-MCNC: 16.8 NG/ML
ALBUMIN SERPL ELPH-MCNC: 4.4 G/DL
ALP BLD-CCNC: 70 U/L
ALT SERPL-CCNC: 11 U/L
ANION GAP SERPL CALC-SCNC: 14 MMOL/L
APPEARANCE: CLEAR
AST SERPL-CCNC: 18 U/L
BACTERIA: NEGATIVE
BASOPHILS # BLD AUTO: 0.08 K/UL
BASOPHILS NFR BLD AUTO: 0.9 %
BILIRUB SERPL-MCNC: 0.4 MG/DL
BILIRUBIN URINE: NEGATIVE
BLOOD URINE: NEGATIVE
BUN SERPL-MCNC: 50 MG/DL
CALCIUM SERPL-MCNC: 9 MG/DL
CHLORIDE SERPL-SCNC: 107 MMOL/L
CHOLEST SERPL-MCNC: 215 MG/DL
CO2 SERPL-SCNC: 21 MMOL/L
COLOR: NORMAL
CREAT SERPL-MCNC: 2.38 MG/DL
EGFR: 26 ML/MIN/1.73M2
EOSINOPHIL # BLD AUTO: 0.16 K/UL
EOSINOPHIL NFR BLD AUTO: 1.7 %
ESTIMATED AVERAGE GLUCOSE: 120 MG/DL
GLUCOSE QUALITATIVE U: NEGATIVE
GLUCOSE SERPL-MCNC: 98 MG/DL
HBA1C MFR BLD HPLC: 5.8 %
HCT VFR BLD CALC: 39.7 %
HDLC SERPL-MCNC: 91 MG/DL
HGB BLD-MCNC: 12.5 G/DL
HYALINE CASTS: 1 /LPF
IMM GRANULOCYTES NFR BLD AUTO: 0.3 %
KETONES URINE: NEGATIVE
LDLC SERPL CALC-MCNC: 108 MG/DL
LEUKOCYTE ESTERASE URINE: NEGATIVE
LYMPHOCYTES # BLD AUTO: 2.26 K/UL
LYMPHOCYTES NFR BLD AUTO: 24.2 %
MAN DIFF?: NORMAL
MCHC RBC-ENTMCNC: 29.8 PG
MCHC RBC-ENTMCNC: 31.5 GM/DL
MCV RBC AUTO: 94.5 FL
MICROSCOPIC-UA: NORMAL
MONOCYTES # BLD AUTO: 0.72 K/UL
MONOCYTES NFR BLD AUTO: 7.7 %
NEUTROPHILS # BLD AUTO: 6.08 K/UL
NEUTROPHILS NFR BLD AUTO: 65.2 %
NITRITE URINE: NEGATIVE
NONHDLC SERPL-MCNC: 124 MG/DL
NT-PROBNP SERPL-MCNC: 330 PG/ML
PH URINE: 6
PLATELET # BLD AUTO: 290 K/UL
POTASSIUM SERPL-SCNC: 4.7 MMOL/L
PROT SERPL-MCNC: 6.5 G/DL
PROTEIN URINE: ABNORMAL
PSA SERPL-MCNC: 3.94 NG/ML
RBC # BLD: 4.2 M/UL
RBC # FLD: 16.1 %
RED BLOOD CELLS URINE: 3 /HPF
SODIUM SERPL-SCNC: 142 MMOL/L
SPECIFIC GRAVITY URINE: 1.02
SQUAMOUS EPITHELIAL CELLS: 1 /HPF
TRIGL SERPL-MCNC: 79 MG/DL
TSH SERPL-ACNC: 3.72 UIU/ML
UROBILINOGEN URINE: NORMAL
WBC # FLD AUTO: 9.33 K/UL
WHITE BLOOD CELLS URINE: 1 /HPF

## 2022-07-25 ENCOUNTER — RX RENEWAL (OUTPATIENT)
Age: 87
End: 2022-07-25

## 2022-07-25 RX ORDER — METOPROLOL SUCCINATE 50 MG/1
50 TABLET, EXTENDED RELEASE ORAL
Qty: 90 | Refills: 3 | Status: ACTIVE | COMMUNITY
Start: 2020-04-14 | End: 1900-01-01

## 2022-08-02 ENCOUNTER — APPOINTMENT (OUTPATIENT)
Dept: INTERNAL MEDICINE | Facility: CLINIC | Age: 87
End: 2022-08-02

## 2022-08-02 VITALS
DIASTOLIC BLOOD PRESSURE: 62 MMHG | HEART RATE: 74 BPM | SYSTOLIC BLOOD PRESSURE: 112 MMHG | WEIGHT: 169 LBS | BODY MASS INDEX: 23.66 KG/M2 | HEIGHT: 71 IN | TEMPERATURE: 97.6 F | OXYGEN SATURATION: 97 %

## 2022-08-02 DIAGNOSIS — Z98.61 ATHEROSCLEROTIC HEART DISEASE OF NATIVE CORONARY ARTERY W/OUT ANGINA PECTORIS: ICD-10-CM

## 2022-08-02 DIAGNOSIS — I25.10 ATHEROSCLEROTIC HEART DISEASE OF NATIVE CORONARY ARTERY W/OUT ANGINA PECTORIS: ICD-10-CM

## 2022-08-02 DIAGNOSIS — M79.89 OTHER SPECIFIED SOFT TISSUE DISORDERS: ICD-10-CM

## 2022-08-02 DIAGNOSIS — N18.9 CHRONIC KIDNEY DISEASE, UNSPECIFIED: ICD-10-CM

## 2022-08-02 PROCEDURE — 99215 OFFICE O/P EST HI 40 MIN: CPT

## 2022-08-02 RX ORDER — ERGOCALCIFEROL 1.25 MG/1
1.25 MG CAPSULE ORAL
Qty: 13 | Refills: 3 | Status: ACTIVE | COMMUNITY
Start: 2021-05-24 | End: 1900-01-01

## 2022-08-03 ENCOUNTER — NON-APPOINTMENT (OUTPATIENT)
Age: 87
End: 2022-08-03

## 2022-08-12 ENCOUNTER — OUTPATIENT (OUTPATIENT)
Dept: OUTPATIENT SERVICES | Facility: HOSPITAL | Age: 87
LOS: 1 days | End: 2022-08-12
Payer: MEDICARE

## 2022-08-12 ENCOUNTER — APPOINTMENT (OUTPATIENT)
Dept: ULTRASOUND IMAGING | Facility: HOSPITAL | Age: 87
End: 2022-08-12

## 2022-08-12 PROCEDURE — 93970 EXTREMITY STUDY: CPT | Mod: 26

## 2022-08-12 PROCEDURE — 93970 EXTREMITY STUDY: CPT

## 2022-08-15 ENCOUNTER — NON-APPOINTMENT (OUTPATIENT)
Age: 87
End: 2022-08-15

## 2022-09-02 ENCOUNTER — RX RENEWAL (OUTPATIENT)
Age: 87
End: 2022-09-02

## 2022-11-01 ENCOUNTER — RX RENEWAL (OUTPATIENT)
Age: 87
End: 2022-11-01

## 2022-11-11 ENCOUNTER — APPOINTMENT (OUTPATIENT)
Dept: INTERNAL MEDICINE | Facility: CLINIC | Age: 87
End: 2022-11-11

## 2022-11-11 VITALS
HEART RATE: 78 BPM | OXYGEN SATURATION: 97 % | DIASTOLIC BLOOD PRESSURE: 70 MMHG | SYSTOLIC BLOOD PRESSURE: 158 MMHG | WEIGHT: 168 LBS | TEMPERATURE: 97.9 F | HEIGHT: 71 IN | BODY MASS INDEX: 23.52 KG/M2

## 2022-11-11 VITALS — DIASTOLIC BLOOD PRESSURE: 50 MMHG | SYSTOLIC BLOOD PRESSURE: 145 MMHG

## 2022-11-11 DIAGNOSIS — N40.0 BENIGN PROSTATIC HYPERPLASIA WITHOUT LOWER URINARY TRACT SYMPMS: ICD-10-CM

## 2022-11-11 DIAGNOSIS — N18.4 CHRONIC KIDNEY DISEASE, STAGE 4 (SEVERE): ICD-10-CM

## 2022-11-11 DIAGNOSIS — I82.411 ACUTE EMBOLISM AND THROMBOSIS OF RIGHT FEMORAL VEIN: ICD-10-CM

## 2022-11-11 DIAGNOSIS — I10 ESSENTIAL (PRIMARY) HYPERTENSION: ICD-10-CM

## 2022-11-11 PROCEDURE — G0008: CPT

## 2022-11-11 PROCEDURE — 99213 OFFICE O/P EST LOW 20 MIN: CPT | Mod: 25

## 2022-11-11 PROCEDURE — 90662 IIV NO PRSV INCREASED AG IM: CPT

## 2022-11-11 RX ORDER — APIXABAN 5 MG/1
5 TABLET, FILM COATED ORAL
Qty: 180 | Refills: 1 | Status: ACTIVE | COMMUNITY
Start: 2022-03-21 | End: 1900-01-01

## 2022-11-11 RX ORDER — TERAZOSIN 10 MG/1
10 CAPSULE ORAL
Qty: 1 | Refills: 1 | Status: ACTIVE | COMMUNITY
Start: 2020-10-13 | End: 1900-01-01

## 2022-11-11 NOTE — HISTORY OF PRESENT ILLNESS
[de-identified] : Mr. YUSEF العراقي is a 87 year old male with history of DVT on Eliquis, HTN, CAD w/ stents, Vit D def, and CKD presenting today for Follow up.  Saw his cardiologist Dr Cardoza 6 weeks ago and reports he had a normal stress test. Has follow up scheduled with his nephrologist Dr Gomez. Repeat duplex in August showed improvement in DVT with some residual peripheral clot. Declines workup for cause of his DVT. Reports his cardiologist recommended he stay on Eliquis.

## 2022-11-11 NOTE — PHYSICAL EXAM
[Normal Sclera/Conjunctiva] : normal sclera/conjunctiva [No Respiratory Distress] : no respiratory distress  [No Accessory Muscle Use] : no accessory muscle use [Normal Rate] : normal rate  [Normal] : affect was normal and insight and judgment were intact [de-identified] : Distal LE edema L>R

## 2023-01-12 ENCOUNTER — APPOINTMENT (OUTPATIENT)
Dept: INTERNAL MEDICINE | Facility: CLINIC | Age: 88
End: 2023-01-12
Payer: MEDICARE

## 2023-01-12 ENCOUNTER — LABORATORY RESULT (OUTPATIENT)
Age: 88
End: 2023-01-12

## 2023-01-12 PROCEDURE — P9615: CPT

## 2023-01-12 PROCEDURE — 36415 COLL VENOUS BLD VENIPUNCTURE: CPT

## 2023-01-13 LAB
ALBUMIN SERPL ELPH-MCNC: 4.1 G/DL
ALP BLD-CCNC: 73 U/L
ALT SERPL-CCNC: 12 U/L
ANION GAP SERPL CALC-SCNC: 9 MMOL/L
APPEARANCE: CLEAR
AST SERPL-CCNC: 17 U/L
BASOPHILS # BLD AUTO: 0.1 K/UL
BASOPHILS NFR BLD AUTO: 1.4 %
BILIRUB SERPL-MCNC: 0.4 MG/DL
BILIRUBIN URINE: NEGATIVE
BLOOD URINE: NEGATIVE
BUN SERPL-MCNC: 49 MG/DL
CALCIUM SERPL-MCNC: 9.5 MG/DL
CHLORIDE SERPL-SCNC: 108 MMOL/L
CO2 SERPL-SCNC: 24 MMOL/L
COLOR: NORMAL
CREAT SERPL-MCNC: 2.2 MG/DL
CREAT SPEC-SCNC: 78 MG/DL
CREAT SPEC-SCNC: 78 MG/DL
CREAT/PROT UR: 1.3 RATIO
EGFR: 28 ML/MIN/1.73M2
EOSINOPHIL # BLD AUTO: 0.18 K/UL
EOSINOPHIL NFR BLD AUTO: 2.5 %
GLUCOSE QUALITATIVE U: NEGATIVE
GLUCOSE SERPL-MCNC: 103 MG/DL
HCT VFR BLD CALC: 39.5 %
HGB BLD-MCNC: 12.7 G/DL
IMM GRANULOCYTES NFR BLD AUTO: 0.3 %
KETONES URINE: NEGATIVE
LEUKOCYTE ESTERASE URINE: NEGATIVE
LYMPHOCYTES # BLD AUTO: 1.93 K/UL
LYMPHOCYTES NFR BLD AUTO: 26.4 %
MAN DIFF?: NORMAL
MCHC RBC-ENTMCNC: 30.6 PG
MCHC RBC-ENTMCNC: 32.2 GM/DL
MCV RBC AUTO: 95.2 FL
MICROALBUMIN 24H UR DL<=1MG/L-MCNC: 71.1 MG/DL
MICROALBUMIN/CREAT 24H UR-RTO: 910 MG/G
MONOCYTES # BLD AUTO: 0.56 K/UL
MONOCYTES NFR BLD AUTO: 7.7 %
NEUTROPHILS # BLD AUTO: 4.52 K/UL
NEUTROPHILS NFR BLD AUTO: 61.7 %
NITRITE URINE: NEGATIVE
PH URINE: 6
PLATELET # BLD AUTO: 299 K/UL
POTASSIUM SERPL-SCNC: 5.4 MMOL/L
PROT SERPL-MCNC: 6.1 G/DL
PROT UR-MCNC: 100 MG/DL
PROTEIN URINE: ABNORMAL
RBC # BLD: 4.15 M/UL
RBC # FLD: 16.3 %
SODIUM SERPL-SCNC: 141 MMOL/L
SPECIFIC GRAVITY URINE: 1.02
UROBILINOGEN URINE: NORMAL
WBC # FLD AUTO: 7.31 K/UL

## 2023-01-23 ENCOUNTER — RX RENEWAL (OUTPATIENT)
Age: 88
End: 2023-01-23

## 2023-04-21 ENCOUNTER — RX RENEWAL (OUTPATIENT)
Age: 88
End: 2023-04-21

## 2023-04-21 RX ORDER — ERGOCALCIFEROL 1.25 MG/1
1.25 MG CAPSULE, LIQUID FILLED ORAL
Qty: 13 | Refills: 3 | Status: ACTIVE | COMMUNITY
Start: 2022-04-04 | End: 1900-01-01

## 2024-07-10 ENCOUNTER — NON-APPOINTMENT (OUTPATIENT)
Age: 89
End: 2024-07-10